# Patient Record
Sex: FEMALE | Race: WHITE | NOT HISPANIC OR LATINO | URBAN - NONMETROPOLITAN AREA
[De-identification: names, ages, dates, MRNs, and addresses within clinical notes are randomized per-mention and may not be internally consistent; named-entity substitution may affect disease eponyms.]

---

## 2024-12-05 ENCOUNTER — APPOINTMENT (OUTPATIENT)
Dept: URBAN - NONMETROPOLITAN AREA CLINIC 59 | Age: 49
Setting detail: DERMATOLOGY
End: 2024-12-05

## 2024-12-05 DIAGNOSIS — L73.2 HIDRADENITIS SUPPURATIVA: ICD-10-CM

## 2024-12-05 PROCEDURE — OTHER ADDITIONAL NOTES: OTHER

## 2024-12-05 PROCEDURE — OTHER MIPS QUALITY: OTHER

## 2024-12-05 PROCEDURE — 99203 OFFICE O/P NEW LOW 30 MIN: CPT

## 2024-12-05 PROCEDURE — OTHER COUNSELING: OTHER

## 2024-12-05 ASSESSMENT — LOCATION ZONE DERM
LOCATION ZONE: TRUNK
LOCATION ZONE: LEG

## 2024-12-05 ASSESSMENT — LOCATION DETAILED DESCRIPTION DERM
LOCATION DETAILED: LEFT MEDIAL BREAST 10-11:00 REGION
LOCATION DETAILED: RIGHT ANTERIOR PROXIMAL THIGH
LOCATION DETAILED: PERIUMBILICAL SKIN
LOCATION DETAILED: RIGHT MEDIAL BUTTOCK

## 2024-12-05 ASSESSMENT — LOCATION SIMPLE DESCRIPTION DERM
LOCATION SIMPLE: RIGHT THIGH
LOCATION SIMPLE: ABDOMEN
LOCATION SIMPLE: RIGHT BUTTOCK
LOCATION SIMPLE: LEFT BREAST

## 2024-12-05 NOTE — PROCEDURE: ADDITIONAL NOTES
Render Risk Assessment In Note?: no
Detail Level: Simple
Additional Notes: Pt referred for ongoing suspected HS. Pt reported she has been dealing with recurrent abscesses for years and commonly sees a Dr at [facility] for I & D with improvement. Pt sees PCP as well as infectious disease for HS problems and has been on multiple oral antibiotics over the years. Pt has lesion on left breast that is currently active - painful but not draining. Mammogram performed showing 5cm mass, breast biopsy pending at this time. All other lesions on abdomen/vaginal/rectum area are healed/controlled at this time. Scarring from old lesions noted in those areas. Pt is also scheduled with plastic surgeon on 12/17/24 for consult on skin removal on abdomen/legs. Removal has been deemed medically necessary. Humira was suggested today for treatment to manage HS. Pt would like to wait to initiate. She would like to meet with plastic surgeon as well as complete breast biopsy prior to Humira initiation. Pt has been on multiple antibiotics over the years and states oral Bactrim works best for her but is not interested in any at this time. We did discuss I & D of the breast but she wishes to talk with her dr regarding the biopsy that is to be planned. She also declined any oral abx and states she usually has ID prescribe for use through her port. Pt advised to call office once she’s ready to update and follow up with treatment.

## 2024-12-05 NOTE — HPI: SKIN LESION
What Type Of Note Output Would You Prefer (Optional)?: Bullet Format
How Severe Is Your Skin Lesion?: moderate
Has Your Skin Lesion Been Treated?: not been treated
Is This A New Presentation, Or A Follow-Up?: Skin Lesion Referral
Who Is Your Referring Provider?: Dr. Henriquez